# Patient Record
Sex: FEMALE | Race: WHITE
[De-identification: names, ages, dates, MRNs, and addresses within clinical notes are randomized per-mention and may not be internally consistent; named-entity substitution may affect disease eponyms.]

---

## 2020-07-07 ENCOUNTER — HOSPITAL ENCOUNTER (OUTPATIENT)
Dept: HOSPITAL 63 - MAMMO | Age: 40
End: 2020-07-07
Attending: SPECIALIST
Payer: COMMERCIAL

## 2020-07-07 DIAGNOSIS — Z12.31: Primary | ICD-10-CM

## 2020-07-07 PROCEDURE — 77067 SCR MAMMO BI INCL CAD: CPT

## 2020-07-08 NOTE — RAD
BILATERAL SCREENING MAMMOGRAM

 

History: Routine screening.

 

Comparison:  None. This is the baseline.

 

Technique: Routine bilateral digital mammogram views were obtained.

 

Findings: 

Breast Tissue Density D :The breasts are extremely dense, which lowers the

sensitivity of mammography.

 

There are no dominant masses, suspicious microcalcifications, or 

architectural distortion.  Asymmetry at the left inner breast 3.3 cm 

present on the projection. This probably represents summation of glandular

tissue.

 

IMPRESSION:

Asymmetry in the left inner breast. Spot compression recommended. 

Ultrasound may be needed.

 

BI-RADS Category 0:  Incomplete:  Need additional imaging evaluation.

 

The images were reviewed with computer aided detection.

 

Patient information is entered into the reminder system with a target due 

date for the next screening mammogram.

 

Mammography is the most sensitive method for finding small breast cancers,

but it does not detect them all and is not a substitute for careful 

clinical examination. A negative mammogram does not negate a clinically 

suspicious finding and should not result in delay in biopsying a 

clinically suspicious abnormality.

 

 "Our facility is accredited by the [employer] 

Mammography Program."

 

Electronically signed by: Carloz Levin MD (7/8/2020 3:44 PM) [id number]

## 2020-07-29 ENCOUNTER — HOSPITAL ENCOUNTER (OUTPATIENT)
Dept: HOSPITAL 63 - MAMMO | Age: 40
Discharge: HOME | End: 2020-07-29
Attending: SPECIALIST
Payer: COMMERCIAL

## 2020-07-29 DIAGNOSIS — R92.2: Primary | ICD-10-CM

## 2020-07-29 PROCEDURE — 77065 DX MAMMO INCL CAD UNI: CPT

## 2020-07-29 NOTE — RAD
DATE: 7/29/2020 1:00 PM



EXAM: DIGITAL DIAGNOSTIC LT



HISTORY:  Screening recall from baseline for asymmetry medial left breast.



COMPARISON: 7/7/2020 bilateral mammogram



Left CC spot compression views as well as a full-field left ML view was

obtained.



This study was interpreted with the benefit of Computerized Aided Detection

(CAD).





FINDINGS:



Breast Density: HETERO  The breast parenchyma Is heterogeneously dense, which

could reduce sensitivity of mammography. Breast parenchyma level C



Questioned mammographic abnormality did not persist.

No suspicious masses, microcalcifications or architectural distortion is

present to suggest malignancy .. 



IMPRESSION: No persistent mammographic abnormality. No evidence of malignancy.





BI-RADS CATEGORY: 1 NEGATIVE



RECOMMENDED FOLLOW-UP: 12M 12 MONTH FOLLOW-UP Annual screening mammography is

recommended, unless clinically indicated sooner based on symptoms or change in

physical exam.



PQRS compliance statement: Patient information was entered into a reminder

system with a target due date 7/8/2021 for the next mammogram.



Mammography is a sensitive method for finding small breast cancers, but it

does not detect them all and is not a substitute for careful clinical

examination.  A negative mammogram does not negate a clinically suspicious

finding and should not result in delay in biopsying a clinically suspicious

abnormality.



"Our facility is accredited by the American [employer] Mammography

Program."

## 2022-02-23 ENCOUNTER — HOSPITAL ENCOUNTER (EMERGENCY)
Dept: HOSPITAL 63 - ER | Age: 42
Discharge: HOME | End: 2022-02-23
Payer: COMMERCIAL

## 2022-02-23 VITALS — HEIGHT: 67 IN | WEIGHT: 187.39 LBS | BODY MASS INDEX: 29.41 KG/M2

## 2022-02-23 VITALS — SYSTOLIC BLOOD PRESSURE: 121 MMHG | DIASTOLIC BLOOD PRESSURE: 79 MMHG

## 2022-02-23 DIAGNOSIS — N93.8: ICD-10-CM

## 2022-02-23 DIAGNOSIS — D25.9: Primary | ICD-10-CM

## 2022-02-23 LAB
BASOPHILS # BLD AUTO: 0 X10^3/UL (ref 0–0.2)
BASOPHILS NFR BLD: 1 % (ref 0–3)
EOSINOPHIL NFR BLD: 0.2 X10^3/UL (ref 0–0.7)
EOSINOPHIL NFR BLD: 3 % (ref 0–3)
ERYTHROCYTE [DISTWIDTH] IN BLOOD BY AUTOMATED COUNT: 13.3 % (ref 11.5–14.5)
HCT VFR BLD CALC: 40.5 % (ref 36–47)
HGB BLD-MCNC: 13.6 G/DL (ref 12–15.5)
LYMPHOCYTES # BLD: 2.4 X10^3/UL (ref 1–4.8)
LYMPHOCYTES NFR BLD AUTO: 32 % (ref 24–48)
MCH RBC QN AUTO: 30 PG (ref 25–35)
MCHC RBC AUTO-ENTMCNC: 34 G/DL (ref 31–37)
MCV RBC AUTO: 90 FL (ref 79–100)
MONO #: 0.4 X10^3/UL (ref 0–1.1)
MONOCYTES NFR BLD: 5 % (ref 0–9)
NEUT #: 4.3 X10^3UL (ref 1.8–7.7)
NEUTROPHILS NFR BLD AUTO: 58 % (ref 31–73)
PLATELET # BLD AUTO: 260 X10^3/UL (ref 140–400)
PREG TEST PT QUAL: NEGATIVE
RBC # BLD AUTO: 4.5 X10^6/UL (ref 3.5–5.4)
WBC # BLD AUTO: 7.3 X10^3/UL (ref 4–11)

## 2022-02-23 PROCEDURE — 85025 COMPLETE CBC W/AUTO DIFF WBC: CPT

## 2022-02-23 PROCEDURE — 84703 CHORIONIC GONADOTROPIN ASSAY: CPT

## 2022-02-23 PROCEDURE — 76856 US EXAM PELVIC COMPLETE: CPT

## 2022-02-23 PROCEDURE — 99284 EMERGENCY DEPT VISIT MOD MDM: CPT

## 2022-02-23 PROCEDURE — 36415 COLL VENOUS BLD VENIPUNCTURE: CPT

## 2022-02-23 PROCEDURE — 76830 TRANSVAGINAL US NON-OB: CPT

## 2022-02-23 NOTE — PHYS DOC
Past History


Additional Past Medical Histor:  fibroids


Past Surgical History:  Tubal ligation, Other


Additional Past Surgical Histo:  L elbow surgery





General Adult


EDM:


Chief Complaint:  VAGINAL BLEEDING





HPI:


HPI:





Patient is a 41-year-old female that presents today with vaginal bleeding.  She 

sta patient is a 41-year-old female that presents today with vaginal bleeding.  

Patient states that she had a normal menstrual cycle last week which ended 

Saturday, she states she started bleeding yesterday morning, and having large 

amounts of clot and a gush of blood when she stands or sits, she states this 

morning she woke up and did not have that much vaginal bleeding, she said she 

got here to the emergency department and had a large rush of blood and clot she 

said she had clots that were the size of her palm.  Patient does have a history 

of a tubal ligation she also takes oral birth control pills, she does have a 

history of fibroids she states that she has attended annual exams with her OB/G

YN to have those evaluated and they have never suggested any treatment.  Patient

sees Dr. Parker with OB/GYN here in [facility].





Review of Systems:


Review of Systems:


Constitutional:  Denies fever or chills 


Eyes:  Denies change in visual acuity 


HENT:  Denies nasal congestion or sore throat 


Respiratory:  Denies cough or shortness of breath 


Cardiovascular:  Denies chest pain or edema 


GI:  Denies abdominal pain, nausea, vomiting, bloody stools or diarrhea 


/GYN: Vaginal bleeding


Musculoskeletal:  Denies back pain or joint pain 


Integument:  Denies rash 


Neurologic:  Denies headache, focal weakness or sensory changes 


Endocrine:  Denies polyuria or polydipsia 


Lymphatic:  Denies swollen glands 


Psychiatric:  Denies depression or anxiety





Physical Exam:


PE:





Constitutional: Well developed, well nourished, no acute distress, non-toxic 

appearance. []


HENT: Normocephalic, atraumatic, bilateral external ears normal, oropharynx 

moist, no oral exudates, nose normal. []


Eyes: PERRLA, EOMI, conjunctiva normal, no discharge. [] 


Neck: Normal range of motion, no tenderness, supple, no stridor. [] 


Cardiovascular:Heart rate regular rhythm, no murmur []


Lungs & Thorax:  Bilateral breath sounds clear to auscultation []


Abdomen: Bowel sounds normal, soft, no tenderness, no masses, no pulsatile 

masses, vaginal bleeding.


Skin: Warm, dry, no erythema, no rash. [] 


Back: No tenderness, no CVA tenderness. [] 


Extremities: No tenderness, no cyanosis, no clubbing, ROM intact, no edema. [] 


Neurologic: Alert and oriented X 3, normal motor function, normal sensory 

function, no focal deficits noted. []


Psychologic: Affect normal, judgement normal, mood normal. []





Current Patient Data:


Labs:





                                Laboratory Tests








Test


 2/23/22


10:18


 


White Blood Count


 7.3 x10^3/uL


(4.0-11.0)


 


Red Blood Count


 4.50 x10^6/uL


(3.50-5.40)


 


Hemoglobin


 13.6 g/dL


(12.0-15.5)


 


Hematocrit


 40.5 %


(36.0-47.0)


 


Mean Corpuscular Volume


 90 fL ()





 


Mean Corpuscular Hemoglobin 30 pg (25-35)  


 


Mean Corpuscular Hemoglobin


Concent 34 g/dL


(31-37)


 


Red Cell Distribution Width


 13.3 %


(11.5-14.5)


 


Platelet Count


 260 x10^3/uL


(140-400)


 


Neutrophils (%) (Auto) 58 % (31-73)  


 


Lymphocytes (%) (Auto) 32 % (24-48)  


 


Monocytes (%) (Auto) 5 % (0-9)  


 


Eosinophils (%) (Auto) 3 % (0-3)  


 


Basophils (%) (Auto) 1 % (0-3)  


 


Neutrophils # (Auto)


 4.3 x10^3uL


(1.8-7.7)


 


Lymphocytes # (Auto)


 2.4 x10^3/uL


(1.0-4.8)


 


Monocytes # (Auto)


 0.4 x10^3/uL


(0.0-1.1)


 


Eosinophils # (Auto)


 0.2 x10^3/uL


(0.0-0.7)


 


Basophils # (Auto)


 0.0 x10^3/uL


(0.0-0.2)








Vital Signs:





                                   Vital Signs








  Date Time  Temp Pulse Resp B/P (MAP) Pulse Ox O2 Delivery O2 Flow Rate FiO2


 


2/23/22 09:31 98.2 76   99 Room Air  











EKG:


EKG:


[]





Radiology/Procedures:


Radiology/Procedures:


PROCEDURE: US PELVIS W/TV





EXAM: ULTRASOUND PELVIS





INDICATION: Vaginal bleeding, history of fibroids. Last menstrual period was 

2/13/2022.





COMPARISON: None available.





TECHNIQUE: Transvaginal sonography was performed.





FINDINGS:


The uterus is anteverted and measures 12.5 x 6.8  x 6.5 centimeters. There is a 

echogenic masslike structure in the endometrium measuring 2.5 x 1.3 x 2.0 cm. 

This has a vascular stalk and is suspicious for a polyp. There are multiple 

intramural myometrial masses, the largest in the anterior fundus measuring 6.5 x

 6.3 x 5.8 cm. Another in the posterior fundus on the right measures 2.9 x 2.3 x

 2.3 cm.





The ovaries are obscured due to bowel shadowing. No adnexal mass or free fluid. 








IMPRESSION:


1. 2.5 cm endometrial mass consistent with an endometrial polyp.


2. At least 3 myometrial fibroids, the largest 6.5 cm.





Electronically signed by: Earlene Guillermo MD (2/23/2022 11:43 AM) [id number][]





Heart Score:


C/O Chest Pain:  N/A


Risk Factors:


Risk Factors:  DM, Current or recent (<one month) smoker, HTN, HLP, family his

tory of CAD, obesity.


Risk Scores:


Score 0 - 3:  2.5% MACE over next 6 weeks - Discharge Home


Score 4 - 6:  20.3% MACE over next 6 weeks - Admit for Clinical Observation


Score 7 - 10:  72.7% MACE over next 6 weeks - Early Invasive Strategies





Course & Med Decision Making:


Course & Med Decision Making


Pertinent Labs and Imaging studies reviewed. (See chart for details)





1200 patient's hemoglobin is stable at 13.9, ultrasound shows fibroids and a 

polyp, patient will be discharged home to follow-up with her OB/GYN for further 

management of her fibroids and polyps.  Patient is to continue her birth control

 pills as prescribed, Tylenol and/or ibuprofen as needed for pain to return here

 to the emergency department or follow-up with her doctor if she soaks 2 pads an

 hour for 3 straight hours.





Dragon Disclaimer:


Dragon Disclaimer:


This electronic medical record was generated, in whole or in part, using a voice

 recognition dictation system.





Departure


Departure:


Impression:  


   Primary Impression:  


   Dysfunctional uterine bleeding


   Additional Impression:  


   Uterine fibroid


   Qualified Codes:  D25.9 - Leiomyoma of uterus, unspecified


Disposition:  01 HOME / SELF CARE / HOMELESS


Condition:  STABLE


Referrals:  


LOIS BOYLE MD (PCP)


Patient Instructions:  Uterine Bleeding, Dysfunctional





Additional Instructions:  


Tylenol and/or ibuprofen as needed for pain


Follow-up with your OB/GYN this afternoon or tomorrow for further management of 

your fibroids and endometrial polyp


Return to the emergency department if you saturate 2 pads an hour for 3 straight

 hours.











JUNIOR MCINTYRE       Feb 23, 2022 11:05

## 2022-02-23 NOTE — RAD
EXAM: ULTRASOUND PELVIS



INDICATION: Vaginal bleeding, history of fibroids. Last menstrual period was 2/13/2022.



COMPARISON: None available.



TECHNIQUE: Transvaginal sonography was performed.



FINDINGS:

The uterus is anteverted and measures 12.5 x 6.8  x 6.5 centimeters. There is a echogenic masslike st
ructure in the endometrium measuring 2.5 x 1.3 x 2.0 cm. This has a vascular stalk and is suspicious 
for a polyp. There are multiple intramural myometrial masses, the largest in the anterior fundus ruthie
uring 6.5 x 6.3 x 5.8 cm. Another in the posterior fundus on the right measures 2.9 x 2.3 x 2.3 cm.



The ovaries are obscured due to bowel shadowing. No adnexal mass or free fluid. 





IMPRESSION:

1. 2.5 cm endometrial mass consistent with an endometrial polyp.

2. At least 3 myometrial fibroids, the largest 6.5 cm.



Electronically signed by: Earlene Guillermo MD (2/23/2022 11:43 AM) [id number]